# Patient Record
Sex: MALE | Race: WHITE
[De-identification: names, ages, dates, MRNs, and addresses within clinical notes are randomized per-mention and may not be internally consistent; named-entity substitution may affect disease eponyms.]

---

## 2017-10-12 ENCOUNTER — HOSPITAL ENCOUNTER (OUTPATIENT)
Dept: HOSPITAL 84 - D.MRI | Age: 37
Discharge: HOME | End: 2017-10-12
Attending: PSYCHIATRY & NEUROLOGY
Payer: COMMERCIAL

## 2017-10-12 DIAGNOSIS — G35: Primary | ICD-10-CM

## 2018-04-10 ENCOUNTER — HOSPITAL ENCOUNTER (OUTPATIENT)
Dept: HOSPITAL 84 - D.MRI | Age: 38
Discharge: HOME | End: 2018-04-10
Attending: PSYCHIATRY & NEUROLOGY
Payer: COMMERCIAL

## 2018-04-10 DIAGNOSIS — G35: Primary | ICD-10-CM

## 2018-04-10 DIAGNOSIS — R42: ICD-10-CM

## 2018-11-20 ENCOUNTER — HOSPITAL ENCOUNTER (OUTPATIENT)
Dept: HOSPITAL 84 - D.MRI | Age: 38
Discharge: HOME | End: 2018-11-20
Attending: PSYCHIATRY & NEUROLOGY
Payer: COMMERCIAL

## 2018-11-20 DIAGNOSIS — G35: Primary | ICD-10-CM

## 2020-03-06 ENCOUNTER — HOSPITAL ENCOUNTER (OUTPATIENT)
Dept: HOSPITAL 84 - D.LAB | Age: 40
Discharge: HOME | End: 2020-03-06
Attending: PSYCHIATRY & NEUROLOGY
Payer: COMMERCIAL

## 2020-03-06 DIAGNOSIS — G35: Primary | ICD-10-CM

## 2020-03-07 LAB
BASOPHILS # BLD AUTO: 0 X10E3/UL (ref 0–0.2)
BASOPHILS NFR BLD AUTO: 1 %
CD3+CD4+ CELLS # BLD: 1264 /UL (ref 359–1519)
CD3+CD4+ CELLS NFR BLD: 48.6 % (ref 30.8–58.5)
EOSINOPHIL # BLD AUTO: 0.2 X10E3/UL (ref 0–0.4)
EOSINOPHIL NFR BLD: 3 %
ERYTHROCYTE [DISTWIDTH] IN BLOOD BY AUTOMATED COUNT: 13.9 % (ref 11.6–15.4)
HCT VFR BLD CALC: 36 % (ref 37.5–51)
HGB BLD-MCNC: 12.9 G/DL (ref 13–17.7)
LYMPHOCYTES # BLD AUTO: 2.6 X10E3/UL (ref 0.7–3.1)
LYMPHOCYTES NFR BLD: 36 %
MCH RBC QN AUTO: 30.4 PG (ref 26.6–33)
MCHC RBC AUTO-ENTMCNC: 35.8 G/DL (ref 31.5–35.7)
MCV RBC: 85 FL (ref 79–97)
MONOCYTES (ABSOLUTE): 0.6 X10E3/UL (ref 0.1–0.9)
MONOCYTES NFR BLD: 9 %
NEUTROPHILS # BLD AUTO: 3.7 X10E3/UL (ref 1.4–7)
NEUTROPHILS NFR BLD AUTO: 51 %
PLATELET # BLD AUTO: 244 X10E3/UL (ref 150–450)
RBC # BLD AUTO: 4.24 X10E6/UL (ref 4.14–5.8)
WBC # BLD AUTO: 7.2 X10E3/UL (ref 3.4–10.8)

## 2020-06-22 ENCOUNTER — HOSPITAL ENCOUNTER (OUTPATIENT)
Dept: HOSPITAL 84 - D.LAB | Age: 40
Discharge: HOME | End: 2020-06-22
Attending: PSYCHIATRY & NEUROLOGY
Payer: COMMERCIAL

## 2020-06-22 DIAGNOSIS — G35: Primary | ICD-10-CM

## 2020-06-22 LAB
ALBUMIN SERPL-MCNC: 4.3 G/DL (ref 3.4–5)
ALP SERPL-CCNC: 79 U/L (ref 30–120)
ALT SERPL-CCNC: 26 U/L (ref 10–68)
ANION GAP SERPL CALC-SCNC: 13.4 MMOL/L (ref 8–16)
BILIRUB SERPL-MCNC: 0.51 MG/DL (ref 0.2–1.3)
BUN SERPL-MCNC: 11 MG/DL (ref 7–18)
CALCIUM SERPL-MCNC: 9.6 MG/DL (ref 8.5–10.1)
CHLORIDE SERPL-SCNC: 100 MMOL/L (ref 98–107)
CO2 SERPL-SCNC: 30.2 MMOL/L (ref 21–32)
CREAT SERPL-MCNC: 0.9 MG/DL (ref 0.6–1.3)
ERYTHROCYTE [DISTWIDTH] IN BLOOD BY AUTOMATED COUNT: 12.6 % (ref 11.5–14.5)
GLOBULIN SER-MCNC: 3.1 G/L
GLUCOSE SERPL-MCNC: 86 MG/DL (ref 74–106)
HCT VFR BLD CALC: 41.6 % (ref 42–54)
HGB BLD-MCNC: 14.5 G/DL (ref 13.5–17.5)
LYMPHOCYTES NFR BLD AUTO: 35.5 % (ref 15–50)
MCH RBC QN AUTO: 31.4 PG (ref 26–34)
MCHC RBC AUTO-ENTMCNC: 34.9 G/DL (ref 31–37)
MCV RBC: 90 FL (ref 80–100)
NEUTROPHILS NFR BLD AUTO: 54.7 % (ref 40–80)
OSMOLALITY SERPL CALC.SUM OF ELEC: 275 MOSM/KG (ref 275–300)
PLATELET # BLD: 214 10X3/UL (ref 130–400)
PMV BLD AUTO: 11.1 FL (ref 7.4–10.4)
POTASSIUM SERPL-SCNC: 4.6 MMOL/L (ref 3.5–5.1)
PROT SERPL-MCNC: 7.4 G/DL (ref 6.4–8.2)
RBC # BLD AUTO: 4.62 10X6/UL (ref 4.2–6.1)
SODIUM SERPL-SCNC: 139 MMOL/L (ref 136–145)
WBC # BLD AUTO: 7.3 10X3/UL (ref 4.8–10.8)

## 2020-06-23 LAB
BASOPHILS # BLD AUTO: 0.1 X10E3/UL (ref 0–0.2)
BASOPHILS NFR BLD AUTO: 1 %
EOSINOPHIL # BLD AUTO: 0.2 X10E3/UL (ref 0–0.4)
EOSINOPHIL NFR BLD: 3 %
ERYTHROCYTE [DISTWIDTH] IN BLOOD BY AUTOMATED COUNT: 13.2 % (ref 11.6–15.4)
HCT VFR BLD CALC: 41.9 % (ref 37.5–51)
HGB BLD-MCNC: 14.3 G/DL (ref 13–17.7)
LYMPHOCYTES # BLD AUTO: 2.5 X10E3/UL (ref 0.7–3.1)
LYMPHOCYTES NFR BLD: 34 %
MCH RBC QN AUTO: 31.4 PG (ref 26.6–33)
MCHC RBC AUTO-ENTMCNC: 34.1 G/DL (ref 31.5–35.7)
MCV RBC: 92 FL (ref 79–97)
MONOCYTES (ABSOLUTE): 0.6 X10E3/UL (ref 0.1–0.9)
MONOCYTES NFR BLD: 9 %
NEUTROPHILS # BLD AUTO: 3.8 X10E3/UL (ref 1.4–7)
NEUTROPHILS NFR BLD AUTO: 52 %
PLATELET # BLD AUTO: 214 X10E3/UL (ref 150–450)
RBC # BLD AUTO: 4.55 X10E6/UL (ref 4.14–5.8)
WBC # BLD AUTO: 7.3 X10E3/UL (ref 3.4–10.8)

## 2020-09-14 LAB
ERYTHROCYTE [DISTWIDTH] IN BLOOD BY AUTOMATED COUNT: 13 % (ref 11.5–14.5)
HCT VFR BLD CALC: 41.4 % (ref 42–54)
HGB BLD-MCNC: 14.3 G/DL (ref 13.5–17.5)
MCH RBC QN AUTO: 31.4 PG (ref 26–34)
MCHC RBC AUTO-ENTMCNC: 34.5 G/DL (ref 31–37)
MCV RBC: 91 FL (ref 80–100)
PLATELET # BLD: 241 10X3/UL (ref 130–400)
PMV BLD AUTO: 11.5 FL (ref 7.4–10.4)
RBC # BLD AUTO: 4.55 10X6/UL (ref 4.2–6.1)
WBC # BLD AUTO: 7.5 10X3/UL (ref 4.8–10.8)

## 2020-09-16 ENCOUNTER — HOSPITAL ENCOUNTER (OUTPATIENT)
Dept: HOSPITAL 84 - D.OPS | Age: 40
Discharge: HOME | End: 2020-09-16
Attending: PODIATRIST
Payer: COMMERCIAL

## 2020-09-16 VITALS — SYSTOLIC BLOOD PRESSURE: 106 MMHG | DIASTOLIC BLOOD PRESSURE: 72 MMHG

## 2020-09-16 VITALS — BODY MASS INDEX: 22.76 KG/M2 | HEIGHT: 70 IN | WEIGHT: 159 LBS

## 2020-09-16 DIAGNOSIS — G58.8: Primary | ICD-10-CM

## 2020-09-16 NOTE — NUR
0930
IV D/C'D WITH CANNULA INTACT, PRESSURE HELD, AND DRSG PLACED.
DISCHARGE INSTRUCTIONS GIVEN AND BOTH PT AND WIFE VERBALIZED AN
UNDERSTANDING. OPERATIVE LEG REMAINS NUMBED AND PT IS W/O C/O. DRSG
CDI AND BOOT IN PLACE

## 2020-09-16 NOTE — OP
PATIENT NAME:  MINA LEMUS                           MEDICAL RECORD: [id number]
:76                                             LOCATION:D.OPS          
                                                         ADMISSION DATE:        
SURGEON:  WAQAS SAAVEDRA DPM        
 
 
DATE OF OPERATION:  2020
 
PREOPERATIVE DIAGNOSIS:  Entrapment nerve left dorsal foot.
 
POSTOPERATIVE DIAGNOSIS:  Entrapment nerve left dorsal foot.
 
PROCEDURE:  Nerve resection, left dorsal foot.
 
ANESTHESIA:  General with local infiltrate utilizing lidocaine and Marcaine
plain, approximately 10 cc total around the proximal ankle.
 
HEMOSTASIS:  Left thigh tourniquet at 350 mmHg.
 
PREOPERATIVE DETAILS:  The patient was taken to the OR and placed on the
operating table in supine position.  This was followed by induction of general
anesthesia and infiltration of local anesthetic.  The left extremity was then
elevated and a tourniquet was inflated.  A 15-blade was used to create a 4-cm
incision over the dorsal aspect of the left foot extending up to the anterior
aspect of the left ankle.  The incision was deepened down through subcutaneous
tissue being sure to avoid all vital structures.  Dissection was carried down to
the nerve, which was running across the anterior aspect of the left ankle and
dorsal foot.  The nerve appeared to be somewhat enlarged.  At this time, it was
freed proximal up into the anterior ankle and cut and also a cut was made on the
distal aspect of the nerve in the wound and the nerve was sent to pathology. 
The wound was flushed.  The subcutaneous tissue was reapproximated with 4-0
Rapide.  The skin was closed with 4-0 Rapide in a subcuticular technique
followed by Dermabond.  Adaptic, 4 x 4 and Conform were used to dress the wound
followed by Ace wrap.  Tourniquet was deflated.
 
POSTOPERATIVE DETAILS:  The patient tolerated the procedure well and left the OR
with vital signs stable and vascular status at preoperative levels.  The patient
was transported to recovery per anesthesia in stable condition.
 
TRANSINT:[id number] Voice Confirmation ID: [id number] DOCUMENT ID: [id number]
                                           
                                           WAQAS SAAVEDRA DPM        
 
 
 
 
 
 
 
 
CC:                                                             [id number]
DICTATION DATE: 20 0758     :     2033      [facility]                                          
[address] [place] [address]

## 2021-04-09 ENCOUNTER — HOSPITAL ENCOUNTER (OUTPATIENT)
Dept: HOSPITAL 84 - D.MRI | Age: 41
Discharge: HOME | End: 2021-04-09
Attending: PSYCHIATRY & NEUROLOGY
Payer: COMMERCIAL

## 2021-04-09 VITALS — BODY MASS INDEX: 22.8 KG/M2

## 2021-04-09 DIAGNOSIS — G35: Primary | ICD-10-CM
